# Patient Record
(demographics unavailable — no encounter records)

---

## 2025-03-04 NOTE — HISTORY OF PRESENT ILLNESS
[FreeTextEntry1] : 31 y/o G0 presents for follow up after beginning a medical  with [facility]. LMP was 25. Took Mifepristone and Misoprostol on 25. HCG was 383 on that day. Pt followed up on 3/1/25, HCG was 433. Pt took second dose of Misoprostol on that day. Yesterday,3/3/25, repeat HCG was 707. Pt has had some vaginal spotting, but no residual pain after doses taken. Ultrasound x 2 at [facility] noted Endometrial thickening and left ovarian cyst.

## 2025-03-04 NOTE — PLAN
[FreeTextEntry1] : Case reviewed with Dr. Uribe.  Reviewed options for management with patient. She prefers another try at medical management. She understands there may be failure with repeated Misoprostol. Above protocol to be followed. Correct medication use discussed.  Will f/u with Dr. Uribe tomorrow.

## 2025-03-05 NOTE — PROCEDURE
[Amenorrhea] : Amenorrhea [Transvaginal Ultrasound] : transvaginal ultrasound [Anteverted] : anteverted [L: ___ cm] : L: [unfilled] cm [W: ___cm] : W: [unfilled] cm [H: ___ cm] : H: [unfilled] cm [FreeTextEntry3] : IUP measuring 5 weeks and 0 days. No yolk sac no fetal pole [FreeTextEntry7] : 2.3 x 2.54 x 1.9 cm [FreeTextEntry8] : 2.53 x 2.19 x 2.08 cm with a corpus luteal cyst [FreeTextEntry4] : Normal uterus and ovaries. IUP with no yolk sac or fetal pole

## 2025-03-05 NOTE — PHYSICAL EXAM
[Chaperone Present] : A chaperone was present in the examining room during all aspects of the physical examination [FreeTextEntry2] : Nikky [Appropriately responsive] : appropriately responsive [Oriented x3] : oriented x3

## 2025-03-05 NOTE — HISTORY OF PRESENT ILLNESS
[FreeTextEntry1] : 32 yr old  presents for follow up after beginning a medical  with [facility].  Patient took 3 doses of 800mcg of Cytotec.  She had cramping but no bleeding.  She is here today for follow up sono.  She took Mifepristone and Misoprostol on 25 and 3/3/25. Trending HCG  25 -  383, 3/1/25 - 433, 3/3/25 - 707 Ultrasound x 2 at [facility] showed - Endometrial thickening and left ovarian cyst  LMP was 25

## 2025-03-05 NOTE — PLAN
[FreeTextEntry1] : Discussed with the patient that medication failure possibly due to the early nature of the pregnancy. Patient has a follow-up appointment on Friday at [facility] for D&C We discussed contraceptive management after the D&C. The patient states that previously on hormonal therapy she had many side effects so is opting for a ParaGard IUD Schedule a follow-up on March 27 for ParaGard IUD placement All questions answered